# Patient Record
Sex: FEMALE | URBAN - METROPOLITAN AREA
[De-identification: names, ages, dates, MRNs, and addresses within clinical notes are randomized per-mention and may not be internally consistent; named-entity substitution may affect disease eponyms.]

---

## 2020-01-15 ENCOUNTER — Encounter (OUTPATIENT)
Dept: URBAN - METROPOLITAN AREA CLINIC 71 | Facility: CLINIC | Age: 60
End: 2020-01-15
Payer: COMMERCIAL

## 2020-01-15 PROCEDURE — 99214 OFFICE O/P EST MOD 30 MIN: CPT | Performed by: OPTOMETRIST

## 2020-01-15 PROCEDURE — 92250 FUNDUS PHOTOGRAPHY W/I&R: CPT | Performed by: OPTOMETRIST

## 2020-01-15 PROCEDURE — 92083 EXTENDED VISUAL FIELD XM: CPT | Performed by: OPTOMETRIST

## 2020-01-30 ENCOUNTER — Encounter (OUTPATIENT)
Dept: URBAN - METROPOLITAN AREA CLINIC 71 | Facility: CLINIC | Age: 60
End: 2020-01-30
Payer: COMMERCIAL

## 2020-01-30 PROCEDURE — 92133 CPTRZD OPH DX IMG PST SGM ON: CPT | Performed by: OPTOMETRIST

## 2020-01-30 PROCEDURE — 92310 CONTACT LENS FITTING OU: CPT | Performed by: OPTOMETRIST

## 2020-01-30 PROCEDURE — 92014 COMPRE OPH EXAM EST PT 1/>: CPT | Performed by: OPTOMETRIST

## 2020-02-20 ENCOUNTER — OFFICE VISIT (OUTPATIENT)
Dept: URBAN - METROPOLITAN AREA CLINIC 71 | Facility: CLINIC | Age: 60
End: 2020-02-20

## 2020-02-20 PROCEDURE — 92310 CONTACT LENS FITTING OU: CPT | Performed by: OPTOMETRIST

## 2021-07-22 ENCOUNTER — OFFICE VISIT (OUTPATIENT)
Dept: URBAN - METROPOLITAN AREA CLINIC 71 | Facility: CLINIC | Age: 61
End: 2021-07-22
Payer: COMMERCIAL

## 2021-07-22 DIAGNOSIS — H25.13 AGE-RELATED NUCLEAR CATARACT, BILATERAL: ICD-10-CM

## 2021-07-22 DIAGNOSIS — H40.1131 PRIMARY OPEN-ANGLE GLAUCOMA, BILATERAL, MILD STAGE: Primary | ICD-10-CM

## 2021-07-22 DIAGNOSIS — H52.4 PRESBYOPIA: ICD-10-CM

## 2021-07-22 PROCEDURE — 92133 CPTRZD OPH DX IMG PST SGM ON: CPT | Performed by: OPTOMETRIST

## 2021-07-22 PROCEDURE — 99214 OFFICE O/P EST MOD 30 MIN: CPT | Performed by: OPTOMETRIST

## 2021-07-22 PROCEDURE — 92134 CPTRZ OPH DX IMG PST SGM RTA: CPT | Performed by: OPTOMETRIST

## 2021-07-22 PROCEDURE — 92310 CONTACT LENS FITTING OU: CPT | Performed by: OPTOMETRIST

## 2021-07-22 PROCEDURE — 76514 ECHO EXAM OF EYE THICKNESS: CPT | Performed by: OPTOMETRIST

## 2021-07-22 ASSESSMENT — KERATOMETRY: OD: 43.63

## 2021-07-22 ASSESSMENT — VISUAL ACUITY
OD: 20/20
OS: 20/25

## 2021-07-22 ASSESSMENT — INTRAOCULAR PRESSURE
OS: 17
OD: 15

## 2021-07-22 NOTE — IMPRESSION/PLAN
Impression: Age-related nuclear cataract, bilateral: H25.13. Plan: Cataracts are stable OU and not interfering with her vision at this time. No treatment currently recommended. The patient will monitor vision changes and contact us with any decrease in vision.

## 2021-07-22 NOTE — IMPRESSION/PLAN
Impression: Primary open-angle glaucoma, bilateral, mild stage: H40.1131. Plan: OCT done today shows a slight thinning in the RNFL OS compared to scan done in 2020. No change in current treatment plan. Continue the latanoprost QPM OU. Discussed that its important to monitor her IOP 3-4 times a year.  Recommend Continued observation in 3-4 months for VF 24-2 w/ OPTOS

## 2021-07-22 NOTE — IMPRESSION/PLAN
Impression: Presbyopia: H52.4. Plan: Presbyopia is the inability to focus on objects (ie: accommodate) due to the loss of flexibility of your natural lens. Presbyopia occurs with age. Reading glasses, bifocals, trifocals or contacts can be helpful. Contact the office if difficulty focusing persists despite corrective eye wear. New CTL RX given today.

## 2021-08-05 ENCOUNTER — TESTING ONLY (OUTPATIENT)
Dept: URBAN - METROPOLITAN AREA CLINIC 71 | Facility: CLINIC | Age: 61
End: 2021-08-05
Payer: COMMERCIAL

## 2021-08-05 PROCEDURE — V2799 MISC VISION ITEM OR SERVICE: HCPCS | Performed by: OPTOMETRIST

## 2022-01-12 ENCOUNTER — OFFICE VISIT (OUTPATIENT)
Dept: URBAN - METROPOLITAN AREA CLINIC 71 | Facility: CLINIC | Age: 62
End: 2022-01-12
Payer: COMMERCIAL

## 2022-01-12 DIAGNOSIS — H25.813 COMBINED FORMS OF AGE-RELATED CATARACT, BILATERAL: ICD-10-CM

## 2022-01-12 DIAGNOSIS — H43.813 VITREOUS DEGENERATION, BILATERAL: ICD-10-CM

## 2022-01-12 PROCEDURE — 92083 EXTENDED VISUAL FIELD XM: CPT | Performed by: OPTOMETRIST

## 2022-01-12 PROCEDURE — 99213 OFFICE O/P EST LOW 20 MIN: CPT | Performed by: OPTOMETRIST

## 2022-01-12 ASSESSMENT — INTRAOCULAR PRESSURE
OS: 15
OD: 14

## 2022-01-12 NOTE — IMPRESSION/PLAN
Impression: Vitreous degeneration, bilateral: H43.813. Plan: The PVD is stable, and there is no evidence of a retinal tear or detachment on dilated exam.  I reviewed the signs and symptoms of a retinal tear and detachment in detail with the patient, including worsening flashes, new floaters, and development of a shadow/curtain in the peripheral visual field. The patient was advised to call immediately with any changes to [address] [facility] or increase in symptoms.

## 2022-01-12 NOTE — IMPRESSION/PLAN
Impression: Combined forms of age-related cataract, bilateral: H25.813. Plan: Cataracts are stable and not interfering with her vision. No treatment currently recommended. The patient will monitor vision changes and contact us with any decrease in vision.

## 2022-01-12 NOTE — IMPRESSION/PLAN
Impression: Primary open-angle glaucoma, bilateral, mild stage: H40.1131. Plan: Diagnosis discussed with the patient. IOP remaining stable on current treatment plan. Continue the latanoprost QPM OU. VF today show WNL OU. due to family HX of glaucoma with her father we will continue to monitor her on a 4 month basis.

## 2022-07-05 ENCOUNTER — OFFICE VISIT (OUTPATIENT)
Dept: URBAN - METROPOLITAN AREA CLINIC 71 | Facility: CLINIC | Age: 62
End: 2022-07-05
Payer: COMMERCIAL

## 2022-07-05 DIAGNOSIS — H40.1131 PRIMARY OPEN-ANGLE GLAUCOMA, BILATERAL, MILD STAGE: Primary | ICD-10-CM

## 2022-07-05 DIAGNOSIS — H25.813 COMBINED FORMS OF AGE-RELATED CATARACT, BILATERAL: ICD-10-CM

## 2022-07-05 PROCEDURE — 99214 OFFICE O/P EST MOD 30 MIN: CPT | Performed by: OPTOMETRIST

## 2022-07-05 PROCEDURE — 92133 CPTRZD OPH DX IMG PST SGM ON: CPT | Performed by: OPTOMETRIST

## 2022-07-05 ASSESSMENT — INTRAOCULAR PRESSURE
OS: 19
OD: 18
OD: 19
OS: 22

## 2022-07-05 NOTE — IMPRESSION/PLAN
Impression: Primary open-angle glaucoma, bilateral, mild stage: H40.1131. Plan: OCT ordered and reviewed with PT. RNFL shows slight thinning OU. Thicker than average pachy's /578 IOP is stable at 19 OU. No changes to treatment plan needed at this time. Continue the Latanoprost QPM OU. Reviewed plugging the lower punctums when administering the GTTS to obtain maximum absorption of the GTTS.  Will continue to monitor in 3

## 2022-10-18 ENCOUNTER — OFFICE VISIT (OUTPATIENT)
Dept: URBAN - METROPOLITAN AREA CLINIC 71 | Facility: CLINIC | Age: 62
End: 2022-10-18
Payer: COMMERCIAL

## 2022-10-18 DIAGNOSIS — H25.813 COMBINED FORMS OF AGE-RELATED CATARACT, BILATERAL: ICD-10-CM

## 2022-10-18 DIAGNOSIS — H40.1131 PRIMARY OPEN-ANGLE GLAUCOMA, BILATERAL, MILD STAGE: Primary | ICD-10-CM

## 2022-10-18 PROCEDURE — 99212 OFFICE O/P EST SF 10 MIN: CPT | Performed by: OPTOMETRIST

## 2022-10-18 PROCEDURE — 92133 CPTRZD OPH DX IMG PST SGM ON: CPT | Performed by: OPTOMETRIST

## 2022-10-18 ASSESSMENT — INTRAOCULAR PRESSURE
OD: 19
OS: 18

## 2022-10-18 NOTE — IMPRESSION/PLAN
Impression: Primary open-angle glaucoma, bilateral, mild stage: H40.1131. Plan: OCT ordered and reviewed today. RNFL remaining stable OU compared to scan from 1/2020. Last VF 1/2022 WNL OU. Last pachy 570/573 IOP is remaining stable OU on current treatment plan. No changes recommended at this time. Continue the Latanoprost QPM OU. Will continue to monitor

## 2023-02-21 ENCOUNTER — OFFICE VISIT (OUTPATIENT)
Dept: URBAN - METROPOLITAN AREA CLINIC 71 | Facility: CLINIC | Age: 63
End: 2023-02-21
Payer: COMMERCIAL

## 2023-02-21 DIAGNOSIS — H43.813 VITREOUS DEGENERATION, BILATERAL: ICD-10-CM

## 2023-02-21 DIAGNOSIS — H40.1131 PRIMARY OPEN-ANGLE GLAUCOMA, BILATERAL, MILD STAGE: Primary | ICD-10-CM

## 2023-02-21 DIAGNOSIS — H25.813 COMBINED FORMS OF AGE-RELATED CATARACT, BILATERAL: ICD-10-CM

## 2023-02-21 PROCEDURE — 92083 EXTENDED VISUAL FIELD XM: CPT | Performed by: OPTOMETRIST

## 2023-02-21 PROCEDURE — 99213 OFFICE O/P EST LOW 20 MIN: CPT | Performed by: OPTOMETRIST

## 2023-02-21 ASSESSMENT — INTRAOCULAR PRESSURE
OD: 15
OS: 16

## 2023-02-21 NOTE — IMPRESSION/PLAN
Impression: Primary open-angle glaucoma, bilateral, mild stage: H40.1131. Plan: VF ordered and reviewed today. VF WNL OU. No progression noted on VF today. IOP is remaining stable OU at 15/16. No changes needed at this time. Continue the Latanoprost QPM OU. 
Will continue observation in 3-4 months

## 2023-05-31 ENCOUNTER — OFFICE VISIT (OUTPATIENT)
Dept: URBAN - METROPOLITAN AREA CLINIC 71 | Facility: CLINIC | Age: 63
End: 2023-05-31
Payer: COMMERCIAL

## 2023-05-31 DIAGNOSIS — H25.813 COMBINED FORMS OF AGE-RELATED CATARACT, BILATERAL: ICD-10-CM

## 2023-05-31 DIAGNOSIS — H40.1131 PRIMARY OPEN-ANGLE GLAUCOMA, BILATERAL, MILD STAGE: Primary | ICD-10-CM

## 2023-05-31 DIAGNOSIS — H43.813 VITREOUS DEGENERATION, BILATERAL: ICD-10-CM

## 2023-05-31 PROCEDURE — 99213 OFFICE O/P EST LOW 20 MIN: CPT | Performed by: OPTOMETRIST

## 2023-05-31 PROCEDURE — 92133 CPTRZD OPH DX IMG PST SGM ON: CPT | Performed by: OPTOMETRIST

## 2023-05-31 PROCEDURE — 92134 CPTRZ OPH DX IMG PST SGM RTA: CPT | Performed by: OPTOMETRIST

## 2023-05-31 ASSESSMENT — INTRAOCULAR PRESSURE
OS: 18
OD: 20

## 2023-05-31 NOTE — IMPRESSION/PLAN
Impression: Primary open-angle glaucoma, bilateral, mild stage: H40.1131. IOP 20/18 RNFL 72/74 Stable Pt uses Latanaprost QHS As long as the RNFL stays stable will not add another drop Father had Glaucoma Plan: Continue Latanaprost as prescribed. Glaucoma is usually a disease of high pressure in the eye that damages the optic nerve and causes loss of peripheral vision and possibly even blindness. Glaucoma treatment with various combinations of eye drops will usually help to lower the eye pressure and prevent further damage. In advanced or poorly controlled cases, laser treatment or conventional glaucoma surgery may be necessary. Contact office if drops are causing pain, irritation, or blurry vision after use.

## 2023-05-31 NOTE — IMPRESSION/PLAN
Impression: Combined forms of age-related cataract, bilateral: H25.813. NS 1+ Cortical 1+ OU Plan: Cataracts account for the patient's complaints. No treatment currently recommended. The patient will monitor vision changes and contact us with any decrease in vision.

## 2023-05-31 NOTE — IMPRESSION/PLAN
Impression: Vitreous degeneration, bilateral: H43.813. PVD OU stable Plan: Contact the office if symptoms worsen, including increased floaters, flashing light, loss of vision or a black curtain blocking your field of vision occurs.